# Patient Record
Sex: FEMALE | Race: AMERICAN INDIAN OR ALASKA NATIVE
[De-identification: names, ages, dates, MRNs, and addresses within clinical notes are randomized per-mention and may not be internally consistent; named-entity substitution may affect disease eponyms.]

---

## 2018-06-02 ENCOUNTER — HOSPITAL ENCOUNTER (EMERGENCY)
Dept: HOSPITAL 42 - ED | Age: 23
Discharge: HOME | End: 2018-06-02
Payer: SELF-PAY

## 2018-06-02 VITALS
HEART RATE: 85 BPM | DIASTOLIC BLOOD PRESSURE: 82 MMHG | RESPIRATION RATE: 18 BRPM | TEMPERATURE: 98.5 F | SYSTOLIC BLOOD PRESSURE: 142 MMHG

## 2018-06-02 VITALS — BODY MASS INDEX: 35 KG/M2

## 2018-06-02 VITALS — OXYGEN SATURATION: 98 %

## 2018-06-02 DIAGNOSIS — J02.8: ICD-10-CM

## 2018-06-02 DIAGNOSIS — Z3A.00: ICD-10-CM

## 2018-06-02 DIAGNOSIS — O21.0: Primary | ICD-10-CM

## 2018-06-02 LAB
ALBUMIN SERPL-MCNC: 4.1 G/DL (ref 3–4.8)
ALBUMIN/GLOB SERPL: 1.3 {RATIO} (ref 1.1–1.8)
ALT SERPL-CCNC: 16 U/L (ref 7–56)
APPEARANCE UR: CLEAR
AST SERPL-CCNC: 19 U/L (ref 14–36)
BACTERIA #/AREA URNS HPF: (no result) /[HPF]
BASOPHILS # BLD AUTO: 0.02 K/MM3 (ref 0–2)
BASOPHILS NFR BLD: 0.3 % (ref 0–3)
BILIRUB UR-MCNC: NEGATIVE MG/DL
BUN SERPL-MCNC: 7 MG/DL (ref 7–21)
CALCIUM SERPL-MCNC: 9.7 MG/DL (ref 8.4–10.5)
COLOR UR: YELLOW
EOSINOPHIL # BLD: 0.1 10*3/UL (ref 0–0.7)
EOSINOPHIL NFR BLD: 0.9 % (ref 1.5–5)
EPI CELLS #/AREA URNS HPF: (no result) /HPF (ref 0–5)
ERYTHROCYTE [DISTWIDTH] IN BLOOD BY AUTOMATED COUNT: 12.4 % (ref 11.5–14.5)
GFR NON-AFRICAN AMERICAN: > 60
GLUCOSE UR STRIP-MCNC: NEGATIVE MG/DL
GRANULOCYTES # BLD: 4.3 10*3/UL (ref 1.4–6.5)
GRANULOCYTES NFR BLD: 62.1 % (ref 50–68)
HGB BLD-MCNC: 11.8 G/DL (ref 12–16)
LEUKOCYTE ESTERASE UR-ACNC: NEGATIVE LEU/UL
LYMPHOCYTES # BLD: 2.1 10*3/UL (ref 1.2–3.4)
LYMPHOCYTES NFR BLD AUTO: 30.1 % (ref 22–35)
MCH RBC QN AUTO: 29.1 PG (ref 25–35)
MCHC RBC AUTO-ENTMCNC: 34.7 G/DL (ref 31–37)
MCV RBC AUTO: 84 FL (ref 80–105)
MONOCYTES # BLD AUTO: 0.5 10*3/UL (ref 0.1–0.6)
MONOCYTES NFR BLD: 6.6 % (ref 1–6)
PH UR STRIP: 7 [PH] (ref 4.7–8)
PLATELET # BLD: 323 10^3/UL (ref 120–450)
PMV BLD AUTO: 9.4 FL (ref 7–11)
PROT UR STRIP-MCNC: (no result) MG/DL
RBC # BLD AUTO: 4.05 10^6/UL (ref 3.5–6.1)
RBC # UR STRIP: NEGATIVE /UL
RBC #/AREA URNS HPF: (no result) /HPF (ref 0–2)
SP GR UR STRIP: 1.02 (ref 1–1.03)
UROBILINOGEN UR STRIP-ACNC: 0.2 E.U./DL
WBC # BLD AUTO: 6.9 10^3/UL (ref 4.5–11)
WBC #/AREA URNS HPF: (no result) /HPF (ref 0–6)

## 2018-06-02 PROCEDURE — 96360 HYDRATION IV INFUSION INIT: CPT

## 2018-06-02 PROCEDURE — 99284 EMERGENCY DEPT VISIT MOD MDM: CPT

## 2018-06-02 PROCEDURE — 87430 STREP A AG IA: CPT

## 2018-06-02 PROCEDURE — 81001 URINALYSIS AUTO W/SCOPE: CPT

## 2018-06-02 PROCEDURE — 80053 COMPREHEN METABOLIC PANEL: CPT

## 2018-06-02 PROCEDURE — 85025 COMPLETE CBC W/AUTO DIFF WBC: CPT

## 2018-06-02 PROCEDURE — 87070 CULTURE OTHR SPECIMN AEROBIC: CPT

## 2018-06-02 PROCEDURE — 83735 ASSAY OF MAGNESIUM: CPT

## 2018-06-02 NOTE — ED PDOC
Arrival/HPI





- General


Chief Complaint: GI Problem


Time Seen by Provider: 18 10:57


Historian: Patient





- History of Present Illness


Narrative History of Present Illness (Text): 





18 11:18





A 22 female, , presents to the emergency department for a complaint of 2 

day duration nausea, vomiting, and sore throat. The patient notes that her last 

menstrual period was . The patient is a non-smoker/drinker. She denies 

fevers, chills, headache, dizziness, chest pain, shortness of breath, dyspnea 

on exertion, cough, rhinorrhea, abdominal pain, diarrhea, back pain, neck pain, 

urinary/bowel changes, vaginal bleeding/discharge, hematuria, or any other 

complaint.














Time/Duration: Other (2 Days)


Symptom Onset: Sudden


Symptom Course: Unchanged


Activities at Onset: Rest, Light


Context: Home


Associated Symptoms (Text): 





18 11:50


LMP .  5 para 0. Patient has had intermittent nausea and vomiting 

with this pregnancy. She states that it began again 2 days ago. No abdominal 

pain or diarrhea. No vaginal discharge or bleeding. No genitourinary symptoms. 

No fever or chills. She also complains of a sore throat. No URI symptoms. She 

does not appear ill or uncomfortable.





Past Medical History





- Provider Review


Nursing Documentation Reviewed: Yes





- Cardiac


Hx Cardiac Disorders: No





- Pulmonary


Hx Respiratory Disorders: No





- Neurological


Hx Neurological Disorder: No





- HEENT


Hx HEENT Disorder: No





- Renal


Hx Renal Disorder: No





- Endocrine/Metabolic


Hx Endocrine Disorders: No





- Hematological/Oncological


Hx Blood Disorders: No





- Integumentary


Hx Dermatological Disorder: No





- Musculoskeletal/Rheumatological


Hx Musculoskeletal Disorders: No





- Gastrointestinal


Hx Gastrointestinal Disorders: No





- Genitourinary/Gynecological


Hx Genitourinary Disorders: No





- Psychiatric


Hx Psychophysiologic Disorder: No


Hx Substance Use: No





Family/Social History





- Physician Review


Nursing Documentation Reviewed: Yes


Family/Social History: No Known Family HX


Smoking Status: Never Smoked


Hx Alcohol Use: No


Hx Substance Use: No





Allergies/Home Meds


Allergies/Adverse Reactions: 


Allergies





No Known Allergies Allergy (Verified 18 11:05)


 








Home Medications: 


 Home Meds











 Medication  Instructions  Recorded  Confirmed


 


No Known Home Med  18














Review of Systems





- Physician Review


All systems were reviewed & negative as marked: Yes





- Review of Systems


Constitutional: absent: Fatigue, Fevers, Night Sweats


ENT: Sore Throat.  absent: Rhinorrhea


Respiratory: absent: SOB, Cough


Cardiovascular: absent: Chest Pain, WOO


Gastrointestinal: Nausea, Vomiting.  absent: Abdominal Pain, Stool Changes, 

Diarrhea


Genitourinary Female: absent: Dysuria, Frequency, Hematuria, Urine Output 

Changes, Vaginal Bleeding, Vaginal Discharge


Musculoskeletal: absent: Back Pain, Neck Pain


Neurological: absent: Headache, Dizziness





Physical Exam


Vital Signs Reviewed: Yes


Vital Signs











  Temp Pulse Resp BP Pulse Ox


 


 18 11:07  99.2 F  101 H  17  132/76  98


 


 18 11:05  99.2 F  103 H  17  132/76  98











Temperature: Afebrile


Blood Pressure: Normal


Pulse: Tachycardic


Respiratory Rate: Normal


Appearance: Positive for: Well-Appearing, Non-Toxic, Comfortable, Other (Obese)


Pain Distress: None


Mental Status: Positive for: Alert and Oriented X 3





- Systems Exam


Head: Present: Atraumatic, Normocephalic


Pupils: Present: PERRL


Extroacular Muscles: Present: EOMI


Conjunctiva: Present: Normal


Ears: Present: NORMAL TM, Normal Canal.  No: Erythema


Mouth: Present: Moist Mucous Membranes


Pharnyx: No: ERYTHEMA, EXUDATE, TONSILS ENLARGED


Neck: Present: Normal Range of Motion


Respiratory/Chest: Present: Clear to Auscultation, Good Air Exchange.  No: 

Respiratory Distress, Accessory Muscle Use


Cardiovascular: Present: Regular Rate and Rhythm, Normal S1, S2.  No: Murmurs


Abdomen: No: Tenderness, Distention, Peritoneal Signs, Rebound, Guarding


Back: Present: Normal Inspection


Upper Extremity: Present: Normal Inspection.  No: Cyanosis, Edema


Lower Extremity: Present: Normal Inspection.  No: Edema


Neurological: Present: GCS=15, CN II-XII Intact, Speech Normal, Motor Func 

Grossly Intact


Skin: Present: Warm, Dry, Normal Color.  No: Rashes


Psychiatric: Present: Alert, Oriented x 3, Normal Insight, Normal Concentration





Medical Decision Making


ED Course and Treatment: 





18 11:21





Impression:


A 22 year old female presents to the emergency department for a complaint of 

nausea, vomiting, and sore throat.  





Plan:


-- Labs


-- Urinalysis 


-- IV Fluids


-- Reassess and disposition








Progress Notes:











18 12:19


Rapid strep is negative. Blood and urine testing are unrevealing. Patient will 

be discharged home to follow up with OB/GYN. Follow-up in the ER as needed. 

Symptomatic treatment. Tylenol as directed on bottle as needed. Cepacol or 

Cepastat as directed on bottle as needed.





- Lab Interpretations


Lab Results: 








 18 11:42 





 18 11:42 





 Lab Results





18 11:42: Sodium 139, Potassium 3.7, Chloride 104, Carbon Dioxide 23, 

Anion Gap 16, BUN 7, Creatinine 0.6 L, Est GFR ( Amer) > 60, Est GFR (Non

-Af Amer) > 60, Random Glucose 86, Calcium 9.7, Magnesium 1.8, Total Bilirubin 

0.2, AST 19, ALT 16, Alkaline Phosphatase 52, Total Protein 7.3, Albumin 4.1, 

Globulin 3.2, Albumin/Globulin Ratio 1.3


18 11:42: Urine Color Yellow, Urine Appearance Clear, Urine pH 7.0, Ur 

Specific Gravity 1.020, Urine Protein Trace H, Urine Glucose (UA) Negative, 

Urine Ketones Negative, Urine Blood Negative, Urine Nitrate Negative, Urine 

Bilirubin Negative, Urine Urobilinogen 0.2, Ur Leukocyte Esterase Negative, 

Urine RBC 0 - 2, Urine WBC 0 - 2, Ur Epithelial Cells 0 - 2, Urine Bacteria 

Trace


18 11:42: WBC 6.9, RBC 4.05, Hgb 11.8 L, Hct 34.0 L, MCV 84.0, MCH 29.1, 

MCHC 34.7, RDW 12.4, Plt Count 323, MPV 9.4, Gran % 62.1, Lymph % (Auto) 30.1, 

Mono % (Auto) 6.6 H, Eos % (Auto) 0.9 L, Baso % (Auto) 0.3, Gran # 4.30, Lymph 

# (Auto) 2.1, Mono # (Auto) 0.5, Eos # (Auto) 0.1, Baso # (Auto) 0.02


18 11:42: Grp A Beta Strep Ag Negative








I have reviewed the lab results: Yes





- Medication Orders


Current Medication Orders: 











Discontinued Medications





Sodium Chloride (Sodium Chloride 0.9%)  1,000 mls @ 1,000 mls/hr IV .Q1H STA


   Stop: 18 12:16


   Last Admin: 18 11:44  Dose: 1,000 mls/hr





eMAR Start Stop


 Document     18 11:44  SRE  (Rec: 18 11:44  SRE  [id number])


     Intravenous Solution


      Start Date                                 18


      Start Time                                 11:20


      End Date                                   18


      End time                                   12:20


      Total Infusion Time                        60














- Scribe Statement


The provider has reviewed the documentation as recorded by the Ladanibcam Eddy 





Provider Scribe Attestation:


All medical record entries made by the Scribe were at my direction and 

personally dictated by me. I have reviewed the chart and agree that the record 

accurately reflects my personal performance of the history, physical exam, 

medical decision making, and the department course for this patient. I have 

also personally directed, reviewed, and agree with the discharge instructions 

and disposition.








Disposition/Present on Arrival





- Present on Arrival


Any Indicators Present on Arrival: No


History of DVT/PE: No


History of Uncontrolled Diabetes: No


Urinary Catheter: No


History of Decub. Ulcer: No


History Surgical Site Infection Following: None





- Disposition


Have Diagnosis and Disposition been Completed?: Yes


Diagnosis: 


 Hyperemesis gravidarum, Sore throat (viral)





Disposition: HOME/ ROUTINE


Disposition Time: 12:20


Patient Plan: Discharge


Condition: GOOD


Discharge Instructions (ExitCare):  Viral Pharyngitis, Hyperemesis Gravidarum


Additional Instructions: 


Tylenol as directed on bottle as needed. Cepacol or Cepastat as directed on 

bottle as needed. Frequent small meals. Follow-up with PMD and OB/GYN. Follow 

up in ER as needed.


Referrals: 


Melissa Bender, [Primary Care Provider] - Follow up with primary


Forms:  [employer] (English)

## 2018-08-03 ENCOUNTER — HOSPITAL ENCOUNTER (EMERGENCY)
Dept: HOSPITAL 31 - C.ER | Age: 23
Discharge: HOME | End: 2018-08-03
Payer: MEDICAID

## 2018-08-03 VITALS
TEMPERATURE: 99 F | HEART RATE: 81 BPM | OXYGEN SATURATION: 98 % | SYSTOLIC BLOOD PRESSURE: 128 MMHG | DIASTOLIC BLOOD PRESSURE: 78 MMHG | RESPIRATION RATE: 18 BRPM

## 2018-08-03 VITALS — BODY MASS INDEX: 35 KG/M2

## 2018-08-03 DIAGNOSIS — K59.00: Primary | ICD-10-CM

## 2018-11-04 ENCOUNTER — HOSPITAL ENCOUNTER (EMERGENCY)
Dept: HOSPITAL 31 - C.EROB | Age: 23
Discharge: HOME | End: 2018-11-04
Payer: COMMERCIAL

## 2018-11-04 VITALS
SYSTOLIC BLOOD PRESSURE: 135 MMHG | HEART RATE: 99 BPM | RESPIRATION RATE: 18 BRPM | DIASTOLIC BLOOD PRESSURE: 82 MMHG | TEMPERATURE: 98 F

## 2018-11-04 VITALS — BODY MASS INDEX: 35 KG/M2

## 2018-11-04 DIAGNOSIS — Z3A.32: ICD-10-CM

## 2018-11-04 DIAGNOSIS — S30.814A: ICD-10-CM
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
## 2018-11-04 DIAGNOSIS — O26.893: Primary | ICD-10-CM

## 2018-12-03 ENCOUNTER — HOSPITAL ENCOUNTER (EMERGENCY)
Dept: HOSPITAL 31 - C.EROB | Age: 23
Discharge: HOME | End: 2018-12-03
Payer: COMMERCIAL

## 2018-12-03 VITALS — SYSTOLIC BLOOD PRESSURE: 115 MMHG | TEMPERATURE: 97.8 F | DIASTOLIC BLOOD PRESSURE: 72 MMHG | HEART RATE: 85 BPM

## 2018-12-03 VITALS — BODY MASS INDEX: 35 KG/M2

## 2018-12-03 DIAGNOSIS — O36.8130: Primary | ICD-10-CM

## 2018-12-03 DIAGNOSIS — E86.0: ICD-10-CM

## 2018-12-03 DIAGNOSIS — O26.893: ICD-10-CM

## 2018-12-03 DIAGNOSIS — Z3A.36: ICD-10-CM

## 2018-12-03 LAB
ALBUMIN SERPL-MCNC: 3.3 G/DL (ref 3.5–5)
ALBUMIN/GLOB SERPL: 1.1 {RATIO} (ref 1–2.1)
ALT SERPL-CCNC: 23 U/L (ref 9–52)
APTT BLD: 28 SECONDS (ref 21–34)
AST SERPL-CCNC: 30 U/L (ref 14–36)
BACTERIA #/AREA URNS HPF: (no result) /[HPF]
BASOPHILS # BLD AUTO: 0 K/UL (ref 0–0.2)
BASOPHILS NFR BLD: 0.6 % (ref 0–2)
BILIRUB UR-MCNC: NEGATIVE MG/DL
BUN SERPL-MCNC: 7 MG/DL (ref 7–17)
CALCIUM SERPL-MCNC: 9 MG/DL (ref 8.6–10.4)
CAOX CRY #/AREA URNS HPF: (no result) /HPF
EOSINOPHIL # BLD AUTO: 0 K/UL (ref 0–0.7)
EOSINOPHIL NFR BLD: 0.6 % (ref 0–4)
ERYTHROCYTE [DISTWIDTH] IN BLOOD BY AUTOMATED COUNT: 13.5 % (ref 11.5–14.5)
GFR NON-AFRICAN AMERICAN: > 60
GLUCOSE UR STRIP-MCNC: NORMAL MG/DL
HGB BLD-MCNC: 11.2 G/DL (ref 11–16)
INR PPP: 1.1
LEUKOCYTE ESTERASE UR-ACNC: (no result) LEU/UL
LYMPHOCYTES # BLD AUTO: 1.9 K/UL (ref 1–4.3)
LYMPHOCYTES NFR BLD AUTO: 26.2 % (ref 20–40)
MCH RBC QN AUTO: 28.4 PG (ref 27–31)
MCHC RBC AUTO-ENTMCNC: 33.1 G/DL (ref 33–37)
MCV RBC AUTO: 85.8 FL (ref 81–99)
MONOCYTES # BLD: 0.8 K/UL (ref 0–0.8)
MONOCYTES NFR BLD: 11.1 % (ref 0–10)
NEUTROPHILS # BLD: 4.4 K/UL (ref 1.8–7)
NEUTROPHILS NFR BLD AUTO: 61.5 % (ref 50–75)
NRBC BLD AUTO-RTO: 0.1 % (ref 0–2)
PH UR STRIP: 5 [PH] (ref 5–8)
PLATELET # BLD: 210 K/UL (ref 130–400)
PMV BLD AUTO: 10.2 FL (ref 7.2–11.7)
PROT UR STRIP-MCNC: (no result) MG/DL
PROTHROMBIN TIME: 11.6 SECONDS (ref 9.7–12.2)
RBC # BLD AUTO: 3.96 MIL/UL (ref 3.8–5.2)
RBC # UR STRIP: (no result) /UL
SP GR UR STRIP: 1.03 (ref 1–1.03)
SQUAMOUS EPITHIAL: 16 /HPF (ref 0–5)
URATE SERPL-MCNC: 5 MG/DL (ref 2.2–7.5)
UROBILINOGEN UR-MCNC: NORMAL MG/DL (ref 0.2–1)
WBC # BLD AUTO: 7.1 K/UL (ref 4.8–10.8)

## 2018-12-07 ENCOUNTER — HOSPITAL ENCOUNTER (INPATIENT)
Dept: HOSPITAL 31 - C.EROB | Age: 23
LOS: 3 days | Discharge: HOME | DRG: 370 | End: 2018-12-10
Attending: OBSTETRICS & GYNECOLOGY | Admitting: OBSTETRICS & GYNECOLOGY
Payer: COMMERCIAL

## 2018-12-07 VITALS — BODY MASS INDEX: 41.9 KG/M2

## 2018-12-07 DIAGNOSIS — E66.9: ICD-10-CM

## 2018-12-07 DIAGNOSIS — D62: ICD-10-CM

## 2018-12-07 DIAGNOSIS — Z87.51: ICD-10-CM

## 2018-12-07 DIAGNOSIS — Z3A.37: ICD-10-CM

## 2018-12-07 DIAGNOSIS — O34.211: Primary | ICD-10-CM

## 2018-12-07 LAB
ALBUMIN SERPL-MCNC: 3.2 G/DL (ref 3.5–5)
ALBUMIN/GLOB SERPL: 1 {RATIO} (ref 1–2.1)
ALT SERPL-CCNC: 20 U/L (ref 9–52)
APTT BLD: 27 SECONDS (ref 21–34)
AST SERPL-CCNC: 22 U/L (ref 14–36)
BACTERIA #/AREA URNS HPF: (no result) /[HPF]
BASOPHILS # BLD AUTO: 0.1 K/UL (ref 0–0.2)
BASOPHILS NFR BLD: 0.8 % (ref 0–2)
BILIRUB UR-MCNC: NEGATIVE MG/DL
BUN SERPL-MCNC: 7 MG/DL (ref 7–17)
CALCIUM SERPL-MCNC: 8.9 MG/DL (ref 8.6–10.4)
EOSINOPHIL # BLD AUTO: 0.1 K/UL (ref 0–0.7)
EOSINOPHIL NFR BLD: 0.6 % (ref 0–4)
ERYTHROCYTE [DISTWIDTH] IN BLOOD BY AUTOMATED COUNT: 13.5 % (ref 11.5–14.5)
FIBRINOGEN PPP-MCNC: 447 MG/DL (ref 200–400)
GFR NON-AFRICAN AMERICAN: > 60
GLUCOSE UR STRIP-MCNC: NORMAL MG/DL
HGB BLD-MCNC: 11.1 G/DL (ref 11–16)
INR PPP: 1
LEUKOCYTE ESTERASE UR-ACNC: (no result) LEU/UL
LYMPHOCYTES # BLD AUTO: 2 K/UL (ref 1–4.3)
LYMPHOCYTES NFR BLD AUTO: 24.1 % (ref 20–40)
MCH RBC QN AUTO: 28.5 PG (ref 27–31)
MCHC RBC AUTO-ENTMCNC: 33.1 G/DL (ref 33–37)
MCV RBC AUTO: 86.2 FL (ref 81–99)
MONOCYTES # BLD: 0.7 K/UL (ref 0–0.8)
MONOCYTES NFR BLD: 8.8 % (ref 0–10)
NEUTROPHILS # BLD: 5.5 K/UL (ref 1.8–7)
NEUTROPHILS NFR BLD AUTO: 65.7 % (ref 50–75)
NRBC BLD AUTO-RTO: 0.1 % (ref 0–2)
PH UR STRIP: 6 [PH] (ref 5–8)
PLATELET # BLD: 218 K/UL (ref 130–400)
PMV BLD AUTO: 9.8 FL (ref 7.2–11.7)
PROT UR STRIP-MCNC: NEGATIVE MG/DL
PROTHROMBIN TIME: 10.9 SECONDS (ref 9.7–12.2)
RBC # BLD AUTO: 3.91 MIL/UL (ref 3.8–5.2)
RBC # UR STRIP: NEGATIVE /UL
SP GR UR STRIP: 1.02 (ref 1–1.03)
SQUAMOUS EPITHIAL: 7 /HPF (ref 0–5)
URATE SERPL-MCNC: 4.7 MG/DL (ref 2.2–7.5)
UROBILINOGEN UR-MCNC: NORMAL MG/DL (ref 0.2–1)
WBC # BLD AUTO: 8.3 K/UL (ref 4.8–10.8)

## 2018-12-07 RX ADMIN — SIMETHICONE CHEW TAB 80 MG SCH: 80 TABLET ORAL at 15:12

## 2018-12-07 RX ADMIN — SIMETHICONE CHEW TAB 80 MG SCH MG: 80 TABLET ORAL at 21:25

## 2018-12-07 RX ADMIN — SIMETHICONE CHEW TAB 80 MG SCH: 80 TABLET ORAL at 18:03

## 2018-12-08 LAB
ERYTHROCYTE [DISTWIDTH] IN BLOOD BY AUTOMATED COUNT: 13.8 % (ref 11.5–14.5)
HGB BLD-MCNC: 9.7 G/DL (ref 11–16)
MCH RBC QN AUTO: 28.6 PG (ref 27–31)
MCHC RBC AUTO-ENTMCNC: 33.6 G/DL (ref 33–37)
MCV RBC AUTO: 85.3 FL (ref 81–99)
PLATELET # BLD: 204 K/UL (ref 130–400)
PMV BLD AUTO: 10.1 FL (ref 7.2–11.7)
RBC # BLD AUTO: 3.4 MIL/UL (ref 3.8–5.2)
WBC # BLD AUTO: 12.5 K/UL (ref 4.8–10.8)

## 2018-12-08 RX ADMIN — OXYCODONE HYDROCHLORIDE AND ACETAMINOPHEN PRN TAB: 5; 325 TABLET ORAL at 05:07

## 2018-12-08 RX ADMIN — SIMETHICONE CHEW TAB 80 MG SCH MG: 80 TABLET ORAL at 22:24

## 2018-12-08 RX ADMIN — PRENATAL VIT W/ FE FUMARATE-FA TAB 27-0.8 MG SCH TAB: 27-0.8 TAB at 09:25

## 2018-12-08 RX ADMIN — OXYCODONE HYDROCHLORIDE AND ACETAMINOPHEN PRN TAB: 5; 325 TABLET ORAL at 20:03

## 2018-12-08 RX ADMIN — SIMETHICONE CHEW TAB 80 MG SCH MG: 80 TABLET ORAL at 09:25

## 2018-12-08 RX ADMIN — SIMETHICONE CHEW TAB 80 MG SCH MG: 80 TABLET ORAL at 13:14

## 2018-12-08 RX ADMIN — SIMETHICONE CHEW TAB 80 MG SCH MG: 80 TABLET ORAL at 17:15

## 2018-12-09 LAB
BASOPHILS # BLD AUTO: 0 K/UL (ref 0–0.2)
BASOPHILS NFR BLD: 0.3 % (ref 0–2)
EOSINOPHIL # BLD AUTO: 0.1 K/UL (ref 0–0.7)
EOSINOPHIL NFR BLD: 1.1 % (ref 0–4)
ERYTHROCYTE [DISTWIDTH] IN BLOOD BY AUTOMATED COUNT: 13.8 % (ref 11.5–14.5)
HGB BLD-MCNC: 9.4 G/DL (ref 11–16)
LYMPHOCYTES # BLD AUTO: 1.7 K/UL (ref 1–4.3)
LYMPHOCYTES NFR BLD AUTO: 19.8 % (ref 20–40)
MCH RBC QN AUTO: 28.9 PG (ref 27–31)
MCHC RBC AUTO-ENTMCNC: 33.3 G/DL (ref 33–37)
MCV RBC AUTO: 86.8 FL (ref 81–99)
MONOCYTES # BLD: 0.9 K/UL (ref 0–0.8)
MONOCYTES NFR BLD: 10.1 % (ref 0–10)
NEUTROPHILS # BLD: 5.8 K/UL (ref 1.8–7)
NEUTROPHILS NFR BLD AUTO: 68.7 % (ref 50–75)
NRBC BLD AUTO-RTO: 0 % (ref 0–2)
PLATELET # BLD: 208 K/UL (ref 130–400)
PMV BLD AUTO: 8.6 FL (ref 7.2–11.7)
RBC # BLD AUTO: 3.26 MIL/UL (ref 3.8–5.2)
WBC # BLD AUTO: 8.5 K/UL (ref 4.8–10.8)

## 2018-12-09 RX ADMIN — SIMETHICONE CHEW TAB 80 MG SCH MG: 80 TABLET ORAL at 13:01

## 2018-12-09 RX ADMIN — SIMETHICONE CHEW TAB 80 MG SCH MG: 80 TABLET ORAL at 22:06

## 2018-12-09 RX ADMIN — OXYCODONE HYDROCHLORIDE AND ACETAMINOPHEN PRN TAB: 5; 325 TABLET ORAL at 06:09

## 2018-12-09 RX ADMIN — PRENATAL VIT W/ FE FUMARATE-FA TAB 27-0.8 MG SCH TAB: 27-0.8 TAB at 09:04

## 2018-12-09 RX ADMIN — SIMETHICONE CHEW TAB 80 MG SCH MG: 80 TABLET ORAL at 09:04

## 2018-12-09 RX ADMIN — SIMETHICONE CHEW TAB 80 MG SCH MG: 80 TABLET ORAL at 17:03

## 2018-12-10 VITALS
DIASTOLIC BLOOD PRESSURE: 80 MMHG | TEMPERATURE: 97.7 F | HEART RATE: 87 BPM | OXYGEN SATURATION: 100 % | RESPIRATION RATE: 18 BRPM | SYSTOLIC BLOOD PRESSURE: 122 MMHG

## 2018-12-10 RX ADMIN — PRENATAL VIT W/ FE FUMARATE-FA TAB 27-0.8 MG SCH TAB: 27-0.8 TAB at 10:27

## 2018-12-10 RX ADMIN — SIMETHICONE CHEW TAB 80 MG SCH MG: 80 TABLET ORAL at 10:27

## 2019-03-16 ENCOUNTER — HOSPITAL ENCOUNTER (EMERGENCY)
Dept: HOSPITAL 31 - C.ER | Age: 24
Discharge: HOME | End: 2019-03-16
Payer: SELF-PAY

## 2019-03-16 VITALS
DIASTOLIC BLOOD PRESSURE: 78 MMHG | TEMPERATURE: 98 F | HEART RATE: 87 BPM | SYSTOLIC BLOOD PRESSURE: 147 MMHG | RESPIRATION RATE: 17 BRPM

## 2019-03-16 VITALS — BODY MASS INDEX: 41.9 KG/M2

## 2019-03-16 DIAGNOSIS — J11.1: Primary | ICD-10-CM

## 2019-03-18 VITALS — OXYGEN SATURATION: 97 %
